# Patient Record
Sex: FEMALE | Race: WHITE | NOT HISPANIC OR LATINO | Employment: UNEMPLOYED | URBAN - METROPOLITAN AREA
[De-identification: names, ages, dates, MRNs, and addresses within clinical notes are randomized per-mention and may not be internally consistent; named-entity substitution may affect disease eponyms.]

---

## 2017-02-03 ENCOUNTER — HOSPITAL ENCOUNTER (EMERGENCY)
Facility: HOSPITAL | Age: 47
Discharge: HOME | End: 2017-02-03
Attending: EMERGENCY MEDICINE | Admitting: EMERGENCY MEDICINE

## 2017-02-03 VITALS
WEIGHT: 165 LBS | HEIGHT: 66 IN | BODY MASS INDEX: 26.52 KG/M2 | OXYGEN SATURATION: 96 % | RESPIRATION RATE: 18 BRPM | DIASTOLIC BLOOD PRESSURE: 85 MMHG | HEART RATE: 116 BPM | TEMPERATURE: 98.2 F | SYSTOLIC BLOOD PRESSURE: 134 MMHG

## 2017-02-03 DIAGNOSIS — R11.2 NON-INTRACTABLE VOMITING WITH NAUSEA, UNSPECIFIED VOMITING TYPE: Primary | ICD-10-CM

## 2017-02-03 LAB
ALBUMIN SERPL-MCNC: 4 G/DL (ref 3.5–5.2)
ALBUMIN/GLOB SERPL: 1.1 G/DL
ALP SERPL-CCNC: 109 U/L (ref 39–117)
ALT SERPL W P-5'-P-CCNC: 76 U/L (ref 1–33)
ANION GAP SERPL CALCULATED.3IONS-SCNC: 14.4 MMOL/L
AST SERPL-CCNC: 68 U/L (ref 1–32)
BACTERIA UR QL AUTO: ABNORMAL /HPF
BASOPHILS # BLD AUTO: 0.01 10*3/MM3 (ref 0–0.2)
BASOPHILS NFR BLD AUTO: 0.1 % (ref 0–1.5)
BILIRUB SERPL-MCNC: 0.6 MG/DL (ref 0.1–1.2)
BILIRUB UR QL STRIP: NEGATIVE
BUN BLD-MCNC: 6 MG/DL (ref 6–20)
BUN/CREAT SERPL: 9 (ref 7–25)
CALCIUM SPEC-SCNC: 9.5 MG/DL (ref 8.6–10.5)
CHLORIDE SERPL-SCNC: 96 MMOL/L (ref 98–107)
CLARITY UR: CLEAR
CO2 SERPL-SCNC: 26.6 MMOL/L (ref 22–29)
COLOR UR: YELLOW
CREAT BLD-MCNC: 0.67 MG/DL (ref 0.57–1)
DEPRECATED RDW RBC AUTO: 38.5 FL (ref 37–54)
EOSINOPHIL # BLD AUTO: 0.01 10*3/MM3 (ref 0–0.7)
EOSINOPHIL NFR BLD AUTO: 0.1 % (ref 0.3–6.2)
ERYTHROCYTE [DISTWIDTH] IN BLOOD BY AUTOMATED COUNT: 11.9 % (ref 11.7–13)
GFR SERPL CREATININE-BSD FRML MDRD: 94 ML/MIN/1.73
GLOBULIN UR ELPH-MCNC: 3.8 GM/DL
GLUCOSE BLD-MCNC: 126 MG/DL (ref 65–99)
GLUCOSE UR STRIP-MCNC: NEGATIVE MG/DL
HCT VFR BLD AUTO: 36.7 % (ref 35.6–45.5)
HGB BLD-MCNC: 12.8 G/DL (ref 11.9–15.5)
HGB UR QL STRIP.AUTO: NEGATIVE
HOLD SPECIMEN: NORMAL
HOLD SPECIMEN: NORMAL
HYALINE CASTS UR QL AUTO: ABNORMAL /LPF
IMM GRANULOCYTES # BLD: 0.02 10*3/MM3 (ref 0–0.03)
IMM GRANULOCYTES NFR BLD: 0.2 % (ref 0–0.5)
KETONES UR QL STRIP: NEGATIVE
LEUKOCYTE ESTERASE UR QL STRIP.AUTO: ABNORMAL
LIPASE SERPL-CCNC: 14 U/L (ref 13–60)
LYMPHOCYTES # BLD AUTO: 0.47 10*3/MM3 (ref 0.9–4.8)
LYMPHOCYTES NFR BLD AUTO: 4.2 % (ref 19.6–45.3)
MCH RBC QN AUTO: 30.8 PG (ref 26.9–32)
MCHC RBC AUTO-ENTMCNC: 34.9 G/DL (ref 32.4–36.3)
MCV RBC AUTO: 88.2 FL (ref 80.5–98.2)
MONOCYTES # BLD AUTO: 0.93 10*3/MM3 (ref 0.2–1.2)
MONOCYTES NFR BLD AUTO: 8.4 % (ref 5–12)
NEUTROPHILS # BLD AUTO: 9.69 10*3/MM3 (ref 1.9–8.1)
NEUTROPHILS NFR BLD AUTO: 87 % (ref 42.7–76)
NITRITE UR QL STRIP: NEGATIVE
PH UR STRIP.AUTO: 6.5 [PH] (ref 5–8)
PLATELET # BLD AUTO: 401 10*3/MM3 (ref 140–500)
PMV BLD AUTO: 9 FL (ref 6–12)
POTASSIUM BLD-SCNC: 3.9 MMOL/L (ref 3.5–5.2)
PROT SERPL-MCNC: 7.8 G/DL (ref 6–8.5)
PROT UR QL STRIP: NEGATIVE
RBC # BLD AUTO: 4.16 10*6/MM3 (ref 3.9–5.2)
RBC # UR: ABNORMAL /HPF
REF LAB TEST METHOD: ABNORMAL
SODIUM BLD-SCNC: 137 MMOL/L (ref 136–145)
SP GR UR STRIP: <=1.005 (ref 1–1.03)
SQUAMOUS #/AREA URNS HPF: ABNORMAL /HPF
UROBILINOGEN UR QL STRIP: ABNORMAL
WBC NRBC COR # BLD: 11.13 10*3/MM3 (ref 4.5–10.7)
WBC UR QL AUTO: ABNORMAL /HPF
WHOLE BLOOD HOLD SPECIMEN: NORMAL
WHOLE BLOOD HOLD SPECIMEN: NORMAL

## 2017-02-03 PROCEDURE — 25010000002 HYDROMORPHONE PER 4 MG: Performed by: EMERGENCY MEDICINE

## 2017-02-03 PROCEDURE — 81001 URINALYSIS AUTO W/SCOPE: CPT | Performed by: EMERGENCY MEDICINE

## 2017-02-03 PROCEDURE — 96376 TX/PRO/DX INJ SAME DRUG ADON: CPT

## 2017-02-03 PROCEDURE — 25010000002 ONDANSETRON PER 1 MG: Performed by: EMERGENCY MEDICINE

## 2017-02-03 PROCEDURE — 25010000002 MORPHINE PER 10 MG: Performed by: EMERGENCY MEDICINE

## 2017-02-03 PROCEDURE — 80053 COMPREHEN METABOLIC PANEL: CPT | Performed by: EMERGENCY MEDICINE

## 2017-02-03 PROCEDURE — 99283 EMERGENCY DEPT VISIT LOW MDM: CPT

## 2017-02-03 PROCEDURE — 96375 TX/PRO/DX INJ NEW DRUG ADDON: CPT

## 2017-02-03 PROCEDURE — 96361 HYDRATE IV INFUSION ADD-ON: CPT

## 2017-02-03 PROCEDURE — 96374 THER/PROPH/DIAG INJ IV PUSH: CPT

## 2017-02-03 PROCEDURE — 85025 COMPLETE CBC W/AUTO DIFF WBC: CPT | Performed by: EMERGENCY MEDICINE

## 2017-02-03 PROCEDURE — 87086 URINE CULTURE/COLONY COUNT: CPT | Performed by: EMERGENCY MEDICINE

## 2017-02-03 PROCEDURE — 83690 ASSAY OF LIPASE: CPT | Performed by: EMERGENCY MEDICINE

## 2017-02-03 RX ORDER — ONDANSETRON 4 MG/1
4 TABLET, ORALLY DISINTEGRATING ORAL EVERY 6 HOURS PRN
Qty: 12 TABLET | Refills: 0 | Status: SHIPPED | OUTPATIENT
Start: 2017-02-03 | End: 2017-07-07

## 2017-02-03 RX ORDER — SODIUM CHLORIDE 0.9 % (FLUSH) 0.9 %
10 SYRINGE (ML) INJECTION AS NEEDED
Status: DISCONTINUED | OUTPATIENT
Start: 2017-02-03 | End: 2017-02-03 | Stop reason: HOSPADM

## 2017-02-03 RX ORDER — SODIUM CHLORIDE 9 MG/ML
125 INJECTION, SOLUTION INTRAVENOUS CONTINUOUS
Status: DISCONTINUED | OUTPATIENT
Start: 2017-02-03 | End: 2017-02-03 | Stop reason: HOSPADM

## 2017-02-03 RX ORDER — HYDROCODONE BITARTRATE AND ACETAMINOPHEN 10; 325 MG/1; MG/1
1 TABLET ORAL EVERY 6 HOURS PRN
COMMUNITY
End: 2017-07-07

## 2017-02-03 RX ORDER — ONDANSETRON 2 MG/ML
4 INJECTION INTRAMUSCULAR; INTRAVENOUS ONCE
Status: COMPLETED | OUTPATIENT
Start: 2017-02-03 | End: 2017-02-03

## 2017-02-03 RX ORDER — HYDROMORPHONE HYDROCHLORIDE 1 MG/ML
0.5 INJECTION, SOLUTION INTRAMUSCULAR; INTRAVENOUS; SUBCUTANEOUS ONCE
Status: COMPLETED | OUTPATIENT
Start: 2017-02-03 | End: 2017-02-03

## 2017-02-03 RX ADMIN — ONDANSETRON 4 MG: 2 INJECTION INTRAMUSCULAR; INTRAVENOUS at 12:38

## 2017-02-03 RX ADMIN — SODIUM CHLORIDE 125 ML/HR: 9 INJECTION, SOLUTION INTRAVENOUS at 13:41

## 2017-02-03 RX ADMIN — SODIUM CHLORIDE 500 ML: 9 INJECTION, SOLUTION INTRAVENOUS at 15:31

## 2017-02-03 RX ADMIN — SODIUM CHLORIDE 1000 ML: 9 INJECTION, SOLUTION INTRAVENOUS at 12:38

## 2017-02-03 RX ADMIN — HYDROMORPHONE HYDROCHLORIDE 0.5 MG: 1 INJECTION, SOLUTION INTRAMUSCULAR; INTRAVENOUS; SUBCUTANEOUS at 15:30

## 2017-02-03 RX ADMIN — ONDANSETRON 4 MG: 2 INJECTION INTRAMUSCULAR; INTRAVENOUS at 15:30

## 2017-02-03 RX ADMIN — MORPHINE SULFATE 4 MG: 4 INJECTION, SOLUTION INTRAMUSCULAR; INTRAVENOUS at 12:37

## 2017-02-03 NOTE — DISCHARGE INSTRUCTIONS
You are advised to follow closely with Dr Wade and your doctors at the [facility] in 2-3 days for recheck, final results of lab work, and further testing/treatment as needed.  Drink plenty of fluids and advance your diet as tolerated    Please return to the emergency department immediately with chest pain different than usual for you, shortness of air, increased pain, abdominal pain, persistent vomiting/fever, blood in emesis or stool, lightheadedness/fainting, problems with speech, one sided weakness/numbness, new incontinence, problems with vision,  or for worsening of symptoms or other concerns.

## 2017-02-03 NOTE — ED PROVIDER NOTES
" EMERGENCY DEPARTMENT ENCOUNTER    CHIEF COMPLAINT  Chief Complaint: Flank Pain  History given by: patient   History limited by: none  Room Number: 09/09  PMD: Gonzales Wade MD  Oncologist: [facility]    HPI:  Pt is a 47 y.o. female who presents complaining of bilat flank pain that onset 5 days ago. Pt also reports some N/V since that time. She has not had any vomiting today. Pt denies any weakness or numbness, visual disturbance, fever, CP, cough , SOA, arms or legs swelling but reports decreased urination for the past 4 days. Pt also reports constant diffuse abd pain that is described as \"sharp\" for weeks, worse over the past 2 days. Pt's last BM was 3 days. 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 Pt also reports having some decreased appetite      Duration:  5 days   Onset: gradual   Timing: constant   Location: Bilateral flank pain  Radiation: none  Quality: \"pain\"  Intensity/Severity: moderate  Progression: unchanged   Associated Symptoms: nausea, vomiting, abd pain, decreased urine, decreased appetite  Aggravating Factors: none  Alleviating Factors: none  Previous Episodes: none  Treatment before arrival: none    PAST MEDICAL HISTORY  Active Ambulatory Problems     Diagnosis Date Noted   • No Active Ambulatory Problems     Resolved Ambulatory Problems     Diagnosis Date Noted   • No Resolved Ambulatory Problems     Past Medical History   Diagnosis Date   • Cancer    • Melanoma 2013       PAST SURGICAL HISTORY  Past Surgical History   Procedure Laterality Date   • Cholecystectomy     • Lymph node dissection Left      groin       FAMILY HISTORY  History reviewed. No pertinent family history.    SOCIAL HISTORY  Social History     Social History   • Marital status:      Spouse name: N/A   • Number of children: N/A   • Years of education: N/A     Occupational History   • Not on file.     Social History " Main Topics   • Smoking status: Never Smoker   • Smokeless tobacco: Not on file   • Alcohol use No   • Drug use: No   • Sexual activity: Not on file     Other Topics Concern   • Not on file     Social History Narrative   • No narrative on file       ALLERGIES  Review of patient's allergies indicates no known allergies.    REVIEW OF SYSTEMS  Review of Systems   Constitutional: Positive for appetite change. Negative for fever.   HENT: Negative for sore throat.    Eyes: Negative.    Respiratory: Negative for cough and shortness of breath.    Cardiovascular: Negative for chest pain and leg swelling.   Gastrointestinal: Positive for abdominal pain ( diffuse), nausea and vomiting ( resolved). Negative for diarrhea.   Genitourinary: Positive for decreased urine volume and flank pain ( bilateral). Negative for dysuria.   Musculoskeletal: Negative for neck pain.   Skin: Negative for rash.   Allergic/Immunologic: Negative.    Neurological: Negative for weakness, numbness and headaches.   Hematological: Negative.    Psychiatric/Behavioral: Negative.    All other systems reviewed and are negative.      PHYSICAL EXAM  ED Triage Vitals   Temp Heart Rate Resp BP SpO2   02/03/17 1127 02/03/17 1127 02/03/17 1127 02/03/17 1134 02/03/17 1127   98.2 °F (36.8 °C) 116 15 139/90 97 %      Temp src Heart Rate Source Patient Position BP Location FiO2 (%)   02/03/17 1127 02/03/17 1127 02/03/17 1134 02/03/17 1134 --   Tympanic Monitor Lying Left arm        Physical Exam   Constitutional: She is oriented to person, place, and time. She appears distressed (mild).   Nontoxic appearing   HENT:   Head: Normocephalic and atraumatic.   Mouth/Throat: Mucous membranes are dry.   Eyes: EOM are normal.   Neck: Normal range of motion. Neck supple.   Cardiovascular: Normal rate, regular rhythm, normal heart sounds and intact distal pulses.    No murmur heard.  Pulses:       Posterior tibial pulses are 2+ on the right side, and 2+ on the left side.    Pulmonary/Chest: Effort normal and breath sounds normal. No respiratory distress.   Abdominal: Soft. Bowel sounds are normal. There is tenderness ( discomfort ) in the left lower quadrant. There is no rebound and no guarding.   Musculoskeletal: Normal range of motion. She exhibits no edema.   Neurological: She is alert and oriented to person, place, and time.   Skin: Skin is warm and dry.   Psychiatric: Affect normal.   Nursing note and vitals reviewed.      LAB RESULTS  Lab Results (last 24 hours)     Procedure Component Value Units Date/Time    CBC & Differential [[id number]] Collected:  02/03/17 1241    Specimen:  Blood Updated:  02/03/17 1257    Narrative:       The following orders were created for panel order CBC & Differential.  Procedure                               Abnormality         Status                     ---------                               -----------         ------                     CBC Auto Differential[[id number]]         Abnormal            Final result                 Please view results for these tests on the individual orders.    Comprehensive Metabolic Panel [[id number]]  (Abnormal) Collected:  02/03/17 1241    Specimen:  Blood Updated:  02/03/17 1318     Glucose 126 (H) mg/dL      BUN 6 mg/dL      Creatinine 0.67 mg/dL      Sodium 137 mmol/L      Potassium 3.9 mmol/L      Chloride 96 (L) mmol/L      CO2 26.6 mmol/L      Calcium 9.5 mg/dL      Total Protein 7.8 g/dL      Albumin 4.00 g/dL      ALT (SGPT) 76 (H) U/L      AST (SGOT) 68 (H) U/L      Alkaline Phosphatase 109 U/L      Total Bilirubin 0.6 mg/dL      eGFR Non African Amer 94 mL/min/1.73      Globulin 3.8 gm/dL      A/G Ratio 1.1 g/dL      BUN/Creatinine Ratio 9.0      Anion Gap 14.4 mmol/L     Lipase [[id number]]  (Normal) Collected:  02/03/17 1241    Specimen:  Blood Updated:  02/03/17 1318     Lipase 14 U/L     CBC Auto Differential [[id number]]  (Abnormal) Collected:  02/03/17 1241    Specimen:  Blood Updated:  02/03/17 1257      WBC 11.13 (H) 10*3/mm3      RBC 4.16 10*6/mm3      Hemoglobin 12.8 g/dL      Hematocrit 36.7 %      MCV 88.2 fL      MCH 30.8 pg      MCHC 34.9 g/dL      RDW 11.9 %      RDW-SD 38.5 fl      MPV 9.0 fL      Platelets 401 10*3/mm3      Neutrophil % 87.0 (H) %      Lymphocyte % 4.2 (L) %      Monocyte % 8.4 %      Eosinophil % 0.1 (L) %      Basophil % 0.1 %      Immature Grans % 0.2 %      Neutrophils, Absolute 9.69 (H) 10*3/mm3      Lymphocytes, Absolute 0.47 (L) 10*3/mm3      Monocytes, Absolute 0.93 10*3/mm3      Eosinophils, Absolute 0.01 10*3/mm3      Basophils, Absolute 0.01 10*3/mm3      Immature Grans, Absolute 0.02 10*3/mm3     Urinalysis With / Culture If Indicated [[id number]]  (Abnormal) Collected:  02/03/17 1341    Specimen:  Urine from Urine, Clean Catch Updated:  02/03/17 1410     Color, UA Yellow      Appearance, UA Clear      pH, UA 6.5      Specific Gravity, UA <=1.005      Glucose, UA Negative      Ketones, UA Negative      Bilirubin, UA Negative      Blood, UA Negative      Protein, UA Negative      Leuk Esterase, UA Small (1+) (A)      Nitrite, UA Negative      Urobilinogen, UA 0.2 E.U./dL     Urinalysis, Microscopic Only [[id number]]  (Abnormal) Collected:  02/03/17 1341    Specimen:  Urine from Urine, Clean Catch Updated:  02/03/17 1413     RBC, UA 0-2 (A) /HPF      WBC, UA 0-2 (A) /HPF      Bacteria, UA None Seen /HPF      Squamous Epithelial Cells, UA 0-2 /HPF      Hyaline Casts, UA None Seen /LPF      Methodology Automated Microscopy     Urine Culture [[id number]] Collected:  02/03/17 1341    Specimen:  Urine from Urine, Clean Catch Updated:  02/03/17 1407          I ordered the above labs and reviewed the results      PROCEDURES  Procedures      PROGRESS AND CONSULTS  ED Course     1219- Ordered blood work and UA for further evaluation. Ordered zofran for nausea and morphine for pain. Ordered IVF for hydration.     1513- Ordered Dilaudid for pain and zofran for nausea.     1701 - Rechecked with  pt. Informed pt of her labs showing nothing remarkable and plan to discharge. PT tolerating PO well, nausea improved.  Pt understands and agrees with plan to follow closely. All concerns addressed.     MEDICAL DECISION MAKING       MDM  Number of Diagnoses or Management Options  Non-intractable vomiting with nausea, unspecified vomiting type:      Amount and/or Complexity of Data Reviewed  Clinical lab tests: reviewed and ordered (Blood work - nml)  Decide to obtain previous medical records or to obtain history from someone other than the patient: yes  Review and summarize past medical records: yes (Pt's CT chest on 1/12/17 that shows mild to moderate mediastinal and L lower cervical lymphadenopathy. Pt's CT abd/pel on 1/12/17 shows moderate retroperitoneal pelvic lymph node enlargement in left lower exterior iliac )           DIAGNOSIS  Final diagnoses:   Non-intractable vomiting with nausea, unspecified vomiting type       DISPOSITION  DISCHARGE    Patient discharged in stable condition.    Reviewed implications of results, diagnosis, meds, responsibility to follow up, warning signs and symptoms of possible worsening, potential complications and reasons to return to ER, including any worsening symptoms.    Patient/Family voiced understanding of above instructions.    Discussed plan for discharge, as there is no emergent indication for admission.  Pt/family is agreeable and understands need for follow up and repeat testing.  Pt is aware that discharge does not mean that nothing is wrong but it indicates no emergency is present that requires admission and they must continue care with follow-up as given below or physician of their choice.     FOLLOW-UP  Gonzales Wade MD  [address] [id number] [phone]    Schedule an appointment as soon as possible for a visit in 3 days      Dr Trent or faustina at [facility]    Schedule an appointment as soon as possible for a visit in  3 days           Medication List      New Prescriptions          ondansetron ODT 4 MG disintegrating tablet   Commonly known as:  ZOFRAN ODT   Take 1 tablet by mouth Every 6 (Six) Hours As Needed for vomiting.               Latest Documented Vital Signs:  As of 7:59 PM  BP- 134/85 HR- 116 Temp- 98.2 °F (36.8 °C) (Tympanic) O2 sat- 96%    --  Documentation assistance provided by beata Benitez and Ramses Kelley for Dr. Marquez.  Information recorded by the scribe was done at my direction and has been verified and validated by me.       Luis M Benitez  02/03/17 4717       James Kelley  02/03/17 2000       Shoshana Marquez MD  02/05/17 1510

## 2017-02-03 NOTE — ED NOTES
Pt. Presents with low back pain and abdominal pain starting 5 days ago. Recently diagnosed with stage 4 melanoma with mets to lungs and pelvis.       Evangelist Lopez RN  02/03/17 4805

## 2017-02-04 LAB — BACTERIA SPEC AEROBE CULT: NORMAL

## 2017-07-07 ENCOUNTER — APPOINTMENT (OUTPATIENT)
Dept: CT IMAGING | Facility: HOSPITAL | Age: 47
End: 2017-07-07

## 2017-07-07 ENCOUNTER — HOSPITAL ENCOUNTER (EMERGENCY)
Facility: HOSPITAL | Age: 47
Discharge: HOME | End: 2017-07-07
Attending: EMERGENCY MEDICINE | Admitting: EMERGENCY MEDICINE

## 2017-07-07 VITALS
BODY MASS INDEX: 21.69 KG/M2 | SYSTOLIC BLOOD PRESSURE: 117 MMHG | HEIGHT: 66 IN | WEIGHT: 135 LBS | HEART RATE: 60 BPM | TEMPERATURE: 96.4 F | DIASTOLIC BLOOD PRESSURE: 68 MMHG | OXYGEN SATURATION: 98 % | RESPIRATION RATE: 18 BRPM

## 2017-07-07 DIAGNOSIS — R11.2 NON-INTRACTABLE VOMITING WITH NAUSEA, UNSPECIFIED VOMITING TYPE: Primary | ICD-10-CM

## 2017-07-07 DIAGNOSIS — C43.9 METASTATIC MELANOMA (HCC): ICD-10-CM

## 2017-07-07 LAB
ALBUMIN SERPL-MCNC: 4.2 G/DL (ref 3.5–5.2)
ALBUMIN/GLOB SERPL: 1.1 G/DL
ALP SERPL-CCNC: 94 U/L (ref 39–117)
ALT SERPL W P-5'-P-CCNC: 43 U/L (ref 1–33)
ANION GAP SERPL CALCULATED.3IONS-SCNC: 14.3 MMOL/L
AST SERPL-CCNC: 47 U/L (ref 1–32)
BACTERIA UR QL AUTO: ABNORMAL /HPF
BASOPHILS # BLD AUTO: 0 10*3/MM3 (ref 0–0.2)
BASOPHILS NFR BLD AUTO: 0 % (ref 0–1.5)
BILIRUB SERPL-MCNC: 0.5 MG/DL (ref 0.1–1.2)
BILIRUB UR QL STRIP: NEGATIVE
BUN BLD-MCNC: 13 MG/DL (ref 6–20)
BUN/CREAT SERPL: 16.9 (ref 7–25)
CALCIUM SPEC-SCNC: 9.9 MG/DL (ref 8.6–10.5)
CHLORIDE SERPL-SCNC: 99 MMOL/L (ref 98–107)
CLARITY UR: ABNORMAL
CO2 SERPL-SCNC: 25.7 MMOL/L (ref 22–29)
COLOR UR: ABNORMAL
CREAT BLD-MCNC: 0.77 MG/DL (ref 0.57–1)
DEPRECATED RDW RBC AUTO: 41.4 FL (ref 37–54)
EOSINOPHIL # BLD AUTO: 0.02 10*3/MM3 (ref 0–0.7)
EOSINOPHIL NFR BLD AUTO: 0.4 % (ref 0.3–6.2)
ERYTHROCYTE [DISTWIDTH] IN BLOOD BY AUTOMATED COUNT: 13 % (ref 11.7–13)
GFR SERPL CREATININE-BSD FRML MDRD: 80 ML/MIN/1.73
GLOBULIN UR ELPH-MCNC: 3.7 GM/DL
GLUCOSE BLD-MCNC: 111 MG/DL (ref 65–99)
GLUCOSE UR STRIP-MCNC: NEGATIVE MG/DL
HCG SERPL QL: NEGATIVE
HCT VFR BLD AUTO: 41.4 % (ref 35.6–45.5)
HGB BLD-MCNC: 14.1 G/DL (ref 11.9–15.5)
HGB UR QL STRIP.AUTO: NEGATIVE
HOLD SPECIMEN: NORMAL
HYALINE CASTS UR QL AUTO: ABNORMAL /LPF
IMM GRANULOCYTES # BLD: 0 10*3/MM3 (ref 0–0.03)
IMM GRANULOCYTES NFR BLD: 0 % (ref 0–0.5)
KETONES UR QL STRIP: ABNORMAL
LEUKOCYTE ESTERASE UR QL STRIP.AUTO: ABNORMAL
LIPASE SERPL-CCNC: 28 U/L (ref 13–60)
LYMPHOCYTES # BLD AUTO: 1.04 10*3/MM3 (ref 0.9–4.8)
LYMPHOCYTES NFR BLD AUTO: 18.6 % (ref 19.6–45.3)
MCH RBC QN AUTO: 29.7 PG (ref 26.9–32)
MCHC RBC AUTO-ENTMCNC: 34.1 G/DL (ref 32.4–36.3)
MCV RBC AUTO: 87.3 FL (ref 80.5–98.2)
MONOCYTES # BLD AUTO: 0.56 10*3/MM3 (ref 0.2–1.2)
MONOCYTES NFR BLD AUTO: 10 % (ref 5–12)
MUCOUS THREADS URNS QL MICRO: ABNORMAL /HPF
NEUTROPHILS # BLD AUTO: 3.96 10*3/MM3 (ref 1.9–8.1)
NEUTROPHILS NFR BLD AUTO: 71 % (ref 42.7–76)
NITRITE UR QL STRIP: NEGATIVE
PH UR STRIP.AUTO: 5.5 [PH] (ref 5–8)
PLATELET # BLD AUTO: 323 10*3/MM3 (ref 140–500)
PMV BLD AUTO: 9.6 FL (ref 6–12)
POTASSIUM BLD-SCNC: 3.7 MMOL/L (ref 3.5–5.2)
PROT SERPL-MCNC: 7.9 G/DL (ref 6–8.5)
PROT UR QL STRIP: NEGATIVE
RBC # BLD AUTO: 4.74 10*6/MM3 (ref 3.9–5.2)
RBC # UR: ABNORMAL /HPF
REF LAB TEST METHOD: ABNORMAL
SODIUM BLD-SCNC: 139 MMOL/L (ref 136–145)
SP GR UR STRIP: 1.03 (ref 1–1.03)
SQUAMOUS #/AREA URNS HPF: ABNORMAL /HPF
UROBILINOGEN UR QL STRIP: ABNORMAL
WBC NRBC COR # BLD: 5.58 10*3/MM3 (ref 4.5–10.7)
WBC UR QL AUTO: ABNORMAL /HPF
WHOLE BLOOD HOLD SPECIMEN: NORMAL
WHOLE BLOOD HOLD SPECIMEN: NORMAL

## 2017-07-07 PROCEDURE — 0 IOPAMIDOL 61 % SOLUTION: Performed by: EMERGENCY MEDICINE

## 2017-07-07 PROCEDURE — 81001 URINALYSIS AUTO W/SCOPE: CPT | Performed by: EMERGENCY MEDICINE

## 2017-07-07 PROCEDURE — 84703 CHORIONIC GONADOTROPIN ASSAY: CPT | Performed by: EMERGENCY MEDICINE

## 2017-07-07 PROCEDURE — 96374 THER/PROPH/DIAG INJ IV PUSH: CPT

## 2017-07-07 PROCEDURE — 25010000002 PROMETHAZINE PER 50 MG: Performed by: EMERGENCY MEDICINE

## 2017-07-07 PROCEDURE — 83690 ASSAY OF LIPASE: CPT | Performed by: EMERGENCY MEDICINE

## 2017-07-07 PROCEDURE — 99284 EMERGENCY DEPT VISIT MOD MDM: CPT

## 2017-07-07 PROCEDURE — 85025 COMPLETE CBC W/AUTO DIFF WBC: CPT | Performed by: EMERGENCY MEDICINE

## 2017-07-07 PROCEDURE — 80053 COMPREHEN METABOLIC PANEL: CPT | Performed by: EMERGENCY MEDICINE

## 2017-07-07 PROCEDURE — 36415 COLL VENOUS BLD VENIPUNCTURE: CPT | Performed by: EMERGENCY MEDICINE

## 2017-07-07 PROCEDURE — 87086 URINE CULTURE/COLONY COUNT: CPT | Performed by: EMERGENCY MEDICINE

## 2017-07-07 PROCEDURE — 74177 CT ABD & PELVIS W/CONTRAST: CPT

## 2017-07-07 PROCEDURE — 96361 HYDRATE IV INFUSION ADD-ON: CPT

## 2017-07-07 RX ORDER — LORAZEPAM 0.5 MG/1
0.5 TABLET ORAL 2 TIMES DAILY PRN
COMMUNITY

## 2017-07-07 RX ORDER — PROCHLORPERAZINE MALEATE 10 MG
10 TABLET ORAL EVERY 6 HOURS PRN
COMMUNITY

## 2017-07-07 RX ORDER — SODIUM CHLORIDE 0.9 % (FLUSH) 0.9 %
10 SYRINGE (ML) INJECTION AS NEEDED
Status: DISCONTINUED | OUTPATIENT
Start: 2017-07-07 | End: 2017-07-07 | Stop reason: HOSPADM

## 2017-07-07 RX ORDER — MORPHINE SULFATE 30 MG/1
60 TABLET ORAL 2 TIMES DAILY
COMMUNITY
End: 2017-07-07 | Stop reason: SDUPTHER

## 2017-07-07 RX ORDER — PROMETHAZINE HYDROCHLORIDE 25 MG/1
25 SUPPOSITORY RECTAL EVERY 4 HOURS PRN
Qty: 15 SUPPOSITORY | Refills: 0 | Status: SHIPPED | OUTPATIENT
Start: 2017-07-07

## 2017-07-07 RX ORDER — ONDANSETRON 4 MG/1
8 TABLET, ORALLY DISINTEGRATING ORAL EVERY 6 HOURS PRN
Qty: 20 TABLET | Refills: 0 | Status: SHIPPED | OUTPATIENT
Start: 2017-07-07 | End: 2017-07-07 | Stop reason: HOSPADM

## 2017-07-07 RX ORDER — PROMETHAZINE HYDROCHLORIDE 25 MG/ML
12.5 INJECTION, SOLUTION INTRAMUSCULAR; INTRAVENOUS ONCE
Status: COMPLETED | OUTPATIENT
Start: 2017-07-07 | End: 2017-07-07

## 2017-07-07 RX ORDER — ONDANSETRON 8 MG/1
8 TABLET, ORALLY DISINTEGRATING ORAL EVERY 8 HOURS PRN
Qty: 20 TABLET | Refills: 0 | Status: SHIPPED | OUTPATIENT
Start: 2017-07-07

## 2017-07-07 RX ORDER — OMEPRAZOLE 40 MG/1
40 CAPSULE, DELAYED RELEASE ORAL DAILY PRN
COMMUNITY

## 2017-07-07 RX ORDER — MORPHINE SULFATE 30 MG/1
60 TABLET, FILM COATED, EXTENDED RELEASE ORAL 2 TIMES DAILY PRN
COMMUNITY

## 2017-07-07 RX ADMIN — SODIUM CHLORIDE 1000 ML: 9 INJECTION, SOLUTION INTRAVENOUS at 18:47

## 2017-07-07 RX ADMIN — IOPAMIDOL 85 ML: 612 INJECTION, SOLUTION INTRAVENOUS at 17:57

## 2017-07-07 RX ADMIN — PROMETHAZINE HYDROCHLORIDE 12.5 MG: 25 INJECTION INTRAMUSCULAR; INTRAVENOUS at 17:13

## 2017-07-07 RX ADMIN — SODIUM CHLORIDE 1000 ML: 9 INJECTION, SOLUTION INTRAVENOUS at 17:13

## 2017-07-07 NOTE — PROGRESS NOTES
Clinical Pharmacy Services: Medication History/Medication Reconciliation    Agree with medication history and documentation as performed by Yordy Valadez PharmD Candidate.    Shyann Lennon PharmD, [facility]  Clinical Pharmacy Specialist, Emergency Medicine  Work  Phone: [phone]  ______________________________________________________________________________________________    Clinical Pharmacy Services: Medication History    Concha Mitchell is a 47 y.o. female [facility] with chief complaint of: Vomiting, Nausea    Past Medical History:  Past Medical History:   Diagnosis Date   • Cancer    • Melanoma 2013       No Known Allergies    Medication information was obtained from: Patient reported medication list and pharmacy   Pharmacy and Phone Number: [facility] [phone]    Medication notes: Current medications patient was taking were verified through the patient. For the following prescriptions, the pharmacy did not have a prescription on file, however, patient reported currently taking   1. Dabrafenib 75mg 1 BID  2. trametinib 2mg 1 qd     Current Outpatient Medications:    Prior to Admission medications    Medication Sig Start Date End Date Taking? Authorizing Provider   dabrafenib (TAFINLAR) 75 MG chemo capsule Take 1 mg by mouth 2 (Two) Times a Day.   Yes Historical Provider, MD   LORazepam (ATIVAN) 0.5 MG tablet Take 0.5 mg by mouth 2 (Two) Times a Day As Needed for Anxiety.   Yes Historical Provider, MD   Morphine (MS CONTIN) 30 MG 12 hr tablet Take 60 mg by mouth 2 (Two) Times a Day As Needed for Severe Pain (7-10).   Yes Historical Provider, MD   omeprazole (priLOSEC) 40 MG capsule Take 40 mg by mouth Daily As Needed.   Yes Historical Provider, MD   ondansetron ODT (ZOFRAN ODT) 4 MG disintegrating tablet Take 1 tablet by mouth Every 6 (Six) Hours As Needed for vomiting. 2/3/17  Yes Shoshana Marquez MD   Pembrolizumab (KEYTRUDA IV) Infuse  into a venous catheter.   Yes  Historical Provider, MD   prochlorperazine (COMPAZINE) 10 MG tablet Take 10 mg by mouth Every 6 (Six) Hours As Needed for Nausea or Vomiting.   Yes Historical Provider, MD   trametinib dimethyl sulfoxide (MEKINIST) 2 MG chemo tablet Take 2 mg by mouth Daily.   Yes Historical Provider, MD       This medication list is complete to the best of my knowledge as of 7/7/2017    Please call if questions.     Yordy Valadez, Student Pharmacist

## 2017-07-07 NOTE — ED PROVIDER NOTES
" EMERGENCY DEPARTMENT ENCOUNTER    CHIEF COMPLAINT  Chief Complaint: N/V  History given by: Patient  History limited by: N/A  Room Number: 08/08  PMD: Gonzales Wade MD   Oncology- [facility]      HPI:  Pt is a 47 y.o. female who presents complaining of N/V onset 3 days ago. Pt states that she is unable to keep anything down. Pt states that she has recently switched to immunotherapy for her Stage IV melanoma. Pt denies abdominal pain, diarrhea, constipation, or urinary symptoms. Pt denies any other complaints at this time.     Duration: 3 days  Onset: Gradual  Timing: Constant  Location: N/A  Radiation: N/A  Quality: \"N/V\"  Intensity/Severity: Moderate  Progression: Worsening  Associated Symptoms: None  Aggravating Factors: None  Alleviating Factors: None  Previous Episodes: None  Treatment before arrival: None    PAST MEDICAL HISTORY  Active Ambulatory Problems     Diagnosis Date Noted   • No Active Ambulatory Problems     Resolved Ambulatory Problems     Diagnosis Date Noted   • No Resolved Ambulatory Problems     Past Medical History:   Diagnosis Date   • Cancer    • Melanoma 2013       PAST SURGICAL HISTORY  Past Surgical History:   Procedure Laterality Date   • CHOLECYSTECTOMY     • LYMPH NODE DISSECTION Left     groin       FAMILY HISTORY  History reviewed. No pertinent family history.    SOCIAL HISTORY  Social History     Social History   • Marital status:      Spouse name: N/A   • Number of children: N/A   • Years of education: N/A     Occupational History   • Not on file.     Social History Main Topics   • Smoking status: Never Smoker   • Smokeless tobacco: Not on file   • Alcohol use No   • Drug use: No   • Sexual activity: Not on file     Other Topics Concern   • Not on file     Social History Narrative       ALLERGIES  Review of patient's allergies indicates no known allergies.    REVIEW OF SYSTEMS  Review of Systems   Constitutional: Negative for chills and fever.   HENT: " Negative for congestion and sore throat.    Respiratory: Negative for cough and shortness of breath.    Cardiovascular: Negative for chest pain.   Gastrointestinal: Positive for nausea and vomiting. Negative for abdominal pain, constipation and diarrhea.   Genitourinary: Negative for difficulty urinating and dysuria.   Musculoskeletal: Negative for neck pain.   Skin: Negative for pallor and rash.   Neurological: Negative for weakness, numbness and headaches.   All other systems reviewed and are negative.      PHYSICAL EXAM  ED Triage Vitals   Temp Heart Rate Resp BP SpO2   07/07/17 1622 07/07/17 1622 07/07/17 1635 07/07/17 1635 07/07/17 1622   96.4 °F (35.8 °C) 80 16 120/87 96 %      Temp src Heart Rate Source Patient Position BP Location FiO2 (%)   07/07/17 1622 07/07/17 1622 07/07/17 1635 07/07/17 1635 --   Tympanic Monitor Lying Right arm        Physical Exam   Constitutional: She is oriented to person, place, and time and well-developed, well-nourished, and in no distress. No distress.   HENT:   Head: Normocephalic and atraumatic.   Eyes: EOM are normal. Pupils are equal, round, and reactive to light.   Neck: Normal range of motion. Neck supple.   Cardiovascular: Normal rate, regular rhythm and normal heart sounds.    Pulmonary/Chest: Effort normal and breath sounds normal. No respiratory distress.   Abdominal: Soft. Bowel sounds are normal. She exhibits no mass. There is tenderness (lower). There is no rebound and no guarding.   Musculoskeletal: Normal range of motion. She exhibits no edema.   Neurological: She is alert and oriented to person, place, and time. She has normal sensation and normal strength.   Skin: Skin is warm and dry. No rash noted. No pallor.   Psychiatric: Mood and affect normal.   Nursing note and vitals reviewed.      LAB RESULTS  Lab Results (last 24 hours)     Procedure Component Value Units Date/Time    CBC & Differential [[id number]] Collected:  07/07/17 1646    Specimen:  Blood Updated:   07/07/17 1708    Narrative:       The following orders were created for panel order CBC & Differential.  Procedure                               Abnormality         Status                     ---------                               -----------         ------                     CBC Auto Differential[[id number]]        Abnormal            Final result                 Please view results for these tests on the individual orders.    Comprehensive Metabolic Panel [[id number]]  (Abnormal) Collected:  07/07/17 1646    Specimen:  Blood Updated:  07/07/17 1739     Glucose 111 (H) mg/dL      BUN 13 mg/dL      Creatinine 0.77 mg/dL      Sodium 139 mmol/L      Potassium 3.7 mmol/L      Chloride 99 mmol/L      CO2 25.7 mmol/L      Calcium 9.9 mg/dL      Total Protein 7.9 g/dL      Albumin 4.20 g/dL      ALT (SGPT) 43 (H) U/L      AST (SGOT) 47 (H) U/L      Alkaline Phosphatase 94 U/L      Total Bilirubin 0.5 mg/dL      eGFR Non African Amer 80 mL/min/1.73      Globulin 3.7 gm/dL      A/G Ratio 1.1 g/dL      BUN/Creatinine Ratio 16.9     Anion Gap 14.3 mmol/L     Lipase [[id number]]  (Normal) Collected:  07/07/17 1646    Specimen:  Blood Updated:  07/07/17 1739     Lipase 28 U/L     hCG, Serum, Qualitative [[id number]]  (Normal) Collected:  07/07/17 1646    Specimen:  Blood Updated:  07/07/17 1745     HCG Qualitative Negative    CBC Auto Differential [[id number]]  (Abnormal) Collected:  07/07/17 1646    Specimen:  Blood Updated:  07/07/17 1708     WBC 5.58 10*3/mm3      RBC 4.74 10*6/mm3      Hemoglobin 14.1 g/dL      Hematocrit 41.4 %      MCV 87.3 fL      MCH 29.7 pg      MCHC 34.1 g/dL      RDW 13.0 %      RDW-SD 41.4 fl      MPV 9.6 fL      Platelets 323 10*3/mm3      Neutrophil % 71.0 %      Lymphocyte % 18.6 (L) %      Monocyte % 10.0 %      Eosinophil % 0.4 %      Basophil % 0.0 %      Immature Grans % 0.0 %      Neutrophils, Absolute 3.96 10*3/mm3      Lymphocytes, Absolute 1.04 10*3/mm3      Monocytes, Absolute 0.56  10*3/mm3      Eosinophils, Absolute 0.02 10*3/mm3      Basophils, Absolute 0.00 10*3/mm3      Immature Grans, Absolute 0.00 10*3/mm3     Urinalysis With / Culture If Indicated [[id number]]  (Abnormal) Collected:  07/07/17 1655    Specimen:  Urine from Urine, Clean Catch Updated:  07/07/17 1732     Color, UA Dark Yellow (A)     Appearance, UA Cloudy (A)     pH, UA 5.5     Specific Gravity, UA 1.027     Glucose, UA Negative     Ketones, UA Trace (A)     Bilirubin, UA Negative     Blood, UA Negative     Protein, UA Negative     Leuk Esterase, UA Small (1+) (A)     Nitrite, UA Negative     Urobilinogen, UA 1.0 E.U./dL    Urinalysis, Microscopic Only [[id number]]  (Abnormal) Collected:  07/07/17 1655    Specimen:  Urine from Urine, Clean Catch Updated:  07/07/17 1814     RBC, UA None Seen /HPF      WBC, UA 0-2 /HPF      Bacteria, UA 2+ (A) /HPF      Squamous Epithelial Cells, UA 0-2 /HPF      Hyaline Casts, UA None Seen /LPF      Mucus, UA Moderate/2+ (A) /HPF      Methodology Manual Light Microscopy    Urine Culture [[id number]] Collected:  07/07/17 1655    Specimen:  Urine from Urine, Clean Catch Updated:  07/07/17 1723          I ordered the above labs and reviewed the results.    RADIOLOGY  CT Abdomen Pelvis With Contrast   Final Result           1. Conspicuous adenopathy, compatible with malignancy.   2. No acute inflammatory process of bowel is identified. Small free   fluid in the pelvis is nonspecific. Follow-up as indications persist.   3. No obstructive uropathy.   4. Small likely nodular atelectasis in the left lower lobe, follow-up   suggested to exclude possibility of pulmonary nodule.       Discussed by telephone with Dr. Payne at time of interpretation, 1820   on 07/07/2017.       This report was finalized on 7/7/2017 6:21 PM by Dr. Benjamin Velazquez MD.               I ordered the above noted radiological studies. Interpreted by radiologist. Reviewed by me in PACS.        PROCEDURES  Procedures      PROGRESS AND CONSULTS  ED Course     5:11 PM:  Vitals: BP: 120/87 HR: 80 Temp: 96.4 °F (35.8 °C) (Tympanic) O2 sat: 96%  D/w pt plan for IV fluids for hydration, Phenergan for nausea, abdominal/pelvic CT, and labs for further evaluation.    6:31 PM:  Vitals: BP: 120/87 HR: 80 Temp: 96.4 °F (35.8 °C) (Tympanic) O2 sat: 96%  Rechecked pt. Pt is resting comfortably. Discussed with pt negative CT scan and plan for another bag of IV fluids.     Time 1920  Discussed case with Dr. Pagan  Reviewed history, exam, results and treatments. Discussed concerns and plan of care. Dr. Pagan states to increase the pt's Zofran to 8mg and try a phenergan suppository. Dr. Pagan states if that does not help, try olanzapine 5mg at night for nausea. Patient prefers to use phenergan and zofran.     9:01 PM:  Vitals: BP: 109/74 HR: 51 Temp: 96.4 °F (35.8 °C) (Tympanic) O2 sat: 96%  Rechecked pt. Pt is resting comfortably and states that she feels better. Discussed with pt my discussion with Dr. Pagan and plan for medication changes. Discussed with pt plan for discharge. Pt understands and agrees with the plan, all questions answered.      MEDICAL DECISION MAKING  Results were reviewed/discussed with the patient and they were also made aware of online access. Pt also made aware that some labs, such as cultures, will not be resulted during ER visit and follow up with PMD is necessary.     MDM  Number of Diagnoses or Management Options     Amount and/or Complexity of Data Reviewed  Clinical lab tests: ordered and reviewed  Tests in the radiology section of CPT®: ordered and reviewed           DIAGNOSIS  Final diagnoses:   Non-intractable vomiting with nausea, unspecified vomiting type   Metastatic melanoma       DISPOSITION  2137- DISCHARGE    Patient discharged in stable condition.    Reviewed implications of results, diagnosis, meds, responsibility to follow up, warning signs and symptoms of possible worsening,  potential complications and reasons to return to ER.    Patient/Family voiced understanding of above instructions.    Discussed plan for discharge, as there is no emergent indication for admission.  Pt/family is agreeable and understands need for follow up and repeat testing.  Pt is aware that discharge does not mean that nothing is wrong but it indicates no emergency is present that requires admission and they must continue care with follow-up as given below or physician of their choice.     FOLLOW-UP  Gonzales Wade MD  [address] [id number] [phone]          Timoteo Trent MD  [address] [facility] [address]  [phone]    Schedule an appointment as soon as possible for a visit           Medication List      New Prescriptions          promethazine 25 MG suppository   Commonly known as:  PHENERGAN   Insert 1 suppository into the rectum Every 4 (Four) Hours As Needed for   Nausea or Vomiting.         Changed          ondansetron ODT 8 MG disintegrating tablet   Commonly known as:  ZOFRAN ODT   Take 1 tablet by mouth Every 8 (Eight) Hours As Needed for Nausea or   Vomiting.   What changed:    - medication strength  - how much to take  - when to take this  - reasons to take this           Latest Documented Vital Signs:  As of 9:49 PM  BP- 117/68 HR- 60 Temp- 96.4 °F (35.8 °C) (Tympanic) O2 sat- 98%    --  Documentation assistance provided by beata Lyons for Shaheen Gaytan MD.  Information recorded by the scribe was done at my direction and has been verified and validated by me.     Gregorio Lyons  07/07/17 [id number]       Shaheen Payne MD  07/07/17 [id number]

## 2017-07-09 LAB — BACTERIA SPEC AEROBE CULT: NORMAL
